# Patient Record
(demographics unavailable — no encounter records)

---

## 2017-10-27 NOTE — REP
Supine abdomen two views:

 

There are no comparisons.

 

The bowel gas pattern is normal.  There is an ovoid density left upper quadrant,

likely ingested a capsule.

 

There are linear densities in the pelvis on the right, of uncertain significance.

Vascular calcified atheroma is considered unlikely because of patient age.  This

could be intraluminal material, possibly in the cecum.

 

The skeletal structures and soft tissues are unremarkable.

 

Impression:

 

Linear densities in the pelvis on the left, of uncertain significance, possibly

intraluminal ingested material.

 

Ingested medicinal capsule in the left upper quadrant.

 

The bowel gas pattern is normal.

 

Depending on symptomatology consider CT follow-up.

 

 

Signed by

Nacho Patrick MD 10/27/2017 02:04 P